# Patient Record
Sex: MALE | Race: WHITE | NOT HISPANIC OR LATINO | Employment: OTHER | URBAN - NONMETROPOLITAN AREA
[De-identification: names, ages, dates, MRNs, and addresses within clinical notes are randomized per-mention and may not be internally consistent; named-entity substitution may affect disease eponyms.]

---

## 2018-02-01 ENCOUNTER — OFFICE VISIT (OUTPATIENT)
Dept: GASTROENTEROLOGY | Facility: CLINIC | Age: 68
End: 2018-02-01

## 2018-02-01 VITALS
DIASTOLIC BLOOD PRESSURE: 80 MMHG | OXYGEN SATURATION: 98 % | WEIGHT: 220 LBS | BODY MASS INDEX: 29.8 KG/M2 | SYSTOLIC BLOOD PRESSURE: 122 MMHG | HEART RATE: 59 BPM | HEIGHT: 72 IN

## 2018-02-01 DIAGNOSIS — Z86.010 HX OF COLONIC POLYPS: Primary | ICD-10-CM

## 2018-02-01 PROBLEM — Z86.0100 HX OF COLONIC POLYPS: Status: ACTIVE | Noted: 2018-02-01

## 2018-02-01 PROCEDURE — S0260 H&P FOR SURGERY: HCPCS | Performed by: CLINICAL NURSE SPECIALIST

## 2018-02-01 RX ORDER — ATORVASTATIN CALCIUM 20 MG/1
20 TABLET, FILM COATED ORAL
COMMUNITY
Start: 2017-08-30 | End: 2022-06-30

## 2018-02-01 RX ORDER — SODIUM, POTASSIUM,MAG SULFATES 17.5-3.13G
SOLUTION, RECONSTITUTED, ORAL ORAL
Qty: 2 BOTTLE | Refills: 0 | Status: SHIPPED | OUTPATIENT
Start: 2018-02-01 | End: 2018-05-01 | Stop reason: HOSPADM

## 2018-02-01 NOTE — PROGRESS NOTES
Osbaldo Cat  1950 2/1/2018  Chief Complaint   Patient presents with   • Colonoscopy     Subjective   HPI  Osbaldo Cat is a 67 y.o. male who presents as a referral for preventative maintenance. He has no complaints of nausea or vomiting. No change in bowels. No wt loss. No BRBPR. No melena. There is no family hx for colon cancer. No abdominal pain.  Past Medical History:   Diagnosis Date   • Hx of colonic polyps    • Hypercholesteremia      Past Surgical History:   Procedure Laterality Date   • COLONOSCOPY  05/01/2013    snare ascending, 70, 50, ticks recall 1 year   • COLONOSCOPY W/ POLYPECTOMY  06/12/2014    Hyperplastic polyp at 70 cm repeat exam in 3 years   • HERNIA REPAIR       Outpatient Prescriptions Marked as Taking for the 2/1/18 encounter (Office Visit) with CIARRA Correia   Medication Sig Dispense Refill   • atorvastatin (LIPITOR) 20 MG tablet Take 20 mg by mouth.       No Known Allergies  Social History     Social History   • Marital status: Single     Spouse name: N/A   • Number of children: N/A   • Years of education: N/A     Occupational History   • Not on file.     Social History Main Topics   • Smoking status: Never Smoker   • Smokeless tobacco: Never Used   • Alcohol use Yes      Comment: Moderate   • Drug use: Not on file   • Sexual activity: Not on file     Other Topics Concern   • Not on file     Social History Narrative     Family History   Problem Relation Age of Onset   • Colon polyps Father    • Colon cancer Neg Hx      Health Maintenance   Topic Date Due   • TDAP/TD VACCINES (1 - Tdap) 02/11/1971   • PNEUMOCOCCAL VACCINES (65+ LOW/MEDIUM RISK) (1 of 2 - PCV13) 02/11/2017   • HEPATITIS C SCREENING  04/26/2017   • ZOSTER VACCINE  04/26/2017   • INFLUENZA VACCINE  08/01/2017   • LIPID PANEL  02/01/2018   • COLONOSCOPY  06/12/2024       REVIEW OF SYSTEMS  General: well appearing, no fever chills or sweats, no unexplained wt loss  HEENT: no acute visual or hearing  "disturbances  Cardiovascular: No chest pain or palpitations  Pulmonary: No shortness of breath, coughing, wheezing or hemoptysis  : No burning, urgency, hematuria, or dysuria  Musculoskeletal: No joint pain or stiffness  Peripheral: no edema  Skin: No lesions or rashes  Neuro: No dizziness, headaches, stroke, syncope  Endocrine: No hot or cold intolerances  Hematological: No blood dyscrasias    Objective   Vitals:    02/01/18 1404   BP: 122/80   Pulse: 59   SpO2: 98%   Weight: 99.8 kg (220 lb)   Height: 182.9 cm (72\")     Body mass index is 29.84 kg/(m^2).  Patient's BMI is within normal parameters. No follow-up required.      PHYSICAL EXAM  General: age appropriate well nourished well appearing, no acute distress  Head: normocephalic and atraumatic  Global assessment-supple  Neck-No JVD noted, no lymphadenopathy  Pulmonary-clear to auscultation bilaterally, normal respiratory effort  Cardiovascular-normal rate and rhythm, normal heart sounds, S1 and S2 noted  Abdomen-soft, non tender, non distended, normal bowel sounds all 4 quadrants, no hepatosplenomegaly noted  Extremities-No clubbing cyanosis or edema  Neuro-Non focal, converses appropriately, awake, alert, oriented    Assessment/Plan     Osbaldo was seen today for colonoscopy.    Diagnoses and all orders for this visit:    Hx of colonic polyps  -     SUPREP BOWEL PREP KIT 17.5-3.13-1.6 GM/180ML solution oral solution; Take as directed by office instructions provided  -     Case Request; Standing  -     Implement Anesthesia Orders Day of Procedure; Standing  -     Obtain Informed Consent; Standing  -     Verify bowel prep was successful; Standing  -     Case Request        COLONOSCOPY WITH ANESTHESIA (N/A)  Body mass index is 29.84 kg/(m^2).    Patient instructions on prep prior to procedure provided to the patient.    All risks, benefits, alternatives, and indications of colonoscopy procedure have been discussed with the patient. Risks to include perforation " of the colon requiring possible surgery or colostomy, risk of bleeding from biopsies or removal of colon tissue, possibility of missing a colon polyp or cancer, or adverse drug reaction.  Benefits to include the diagnosis and management of disease of the colon and rectum. Alternatives to include barium enema, radiographic evaluation, lab testing or no intervention. Pt verbalizes understanding and agrees.     Ashanti Dolan, APRN  2/1/2018  2:14 PM

## 2018-04-25 ENCOUNTER — TELEPHONE (OUTPATIENT)
Dept: GASTROENTEROLOGY | Facility: CLINIC | Age: 68
End: 2018-04-25

## 2018-04-25 NOTE — TELEPHONE ENCOUNTER
Patient called states he always does a 2 day prep prior to having his colon and no one said anything about that to him this time he is scheduled for May 1 told him to do a clear liquid supper May 29 and drink a bottle of mag citrate that night and then on the 30 clear liquids all day and start regular prep and follow instructions on his prep sheet.

## 2018-05-01 ENCOUNTER — ANESTHESIA EVENT (OUTPATIENT)
Dept: GASTROENTEROLOGY | Facility: HOSPITAL | Age: 68
End: 2018-05-01

## 2018-05-01 ENCOUNTER — ANESTHESIA (OUTPATIENT)
Dept: GASTROENTEROLOGY | Facility: HOSPITAL | Age: 68
End: 2018-05-01

## 2018-05-01 ENCOUNTER — HOSPITAL ENCOUNTER (OUTPATIENT)
Facility: HOSPITAL | Age: 68
Setting detail: HOSPITAL OUTPATIENT SURGERY
Discharge: HOME | End: 2018-05-01
Attending: INTERNAL MEDICINE | Admitting: INTERNAL MEDICINE

## 2018-05-01 VITALS
BODY MASS INDEX: 29.95 KG/M2 | DIASTOLIC BLOOD PRESSURE: 67 MMHG | SYSTOLIC BLOOD PRESSURE: 108 MMHG | HEIGHT: 73 IN | TEMPERATURE: 97.4 F | WEIGHT: 226 LBS | RESPIRATION RATE: 14 BRPM | HEART RATE: 53 BPM | OXYGEN SATURATION: 99 %

## 2018-05-01 DIAGNOSIS — Z86.010 HX OF COLONIC POLYPS: ICD-10-CM

## 2018-05-01 PROCEDURE — 45385 COLONOSCOPY W/LESION REMOVAL: CPT | Performed by: INTERNAL MEDICINE

## 2018-05-01 PROCEDURE — 88305 TISSUE EXAM BY PATHOLOGIST: CPT | Performed by: INTERNAL MEDICINE

## 2018-05-01 PROCEDURE — 25010000002 PROPOFOL 10 MG/ML EMULSION: Performed by: NURSE ANESTHETIST, CERTIFIED REGISTERED

## 2018-05-01 RX ORDER — PROPOFOL 10 MG/ML
VIAL (ML) INTRAVENOUS AS NEEDED
Status: DISCONTINUED | OUTPATIENT
Start: 2018-05-01 | End: 2018-05-01 | Stop reason: SURG

## 2018-05-01 RX ORDER — SODIUM CHLORIDE 9 MG/ML
100 INJECTION, SOLUTION INTRAVENOUS CONTINUOUS
Status: CANCELLED | OUTPATIENT
Start: 2018-05-01

## 2018-05-01 RX ORDER — SODIUM CHLORIDE 9 MG/ML
500 INJECTION, SOLUTION INTRAVENOUS CONTINUOUS PRN
Status: DISCONTINUED | OUTPATIENT
Start: 2018-05-01 | End: 2018-05-01 | Stop reason: HOSPADM

## 2018-05-01 RX ORDER — SODIUM CHLORIDE 0.9 % (FLUSH) 0.9 %
3 SYRINGE (ML) INJECTION AS NEEDED
Status: DISCONTINUED | OUTPATIENT
Start: 2018-05-01 | End: 2018-05-01 | Stop reason: HOSPADM

## 2018-05-01 RX ORDER — SODIUM CHLORIDE 0.9 % (FLUSH) 0.9 %
1-10 SYRINGE (ML) INJECTION AS NEEDED
Status: CANCELLED | OUTPATIENT
Start: 2018-05-01

## 2018-05-01 RX ADMIN — PROPOFOL 200 MG: 10 INJECTION, EMULSION INTRAVENOUS at 09:12

## 2018-05-01 RX ADMIN — SODIUM CHLORIDE 500 ML: 9 INJECTION, SOLUTION INTRAVENOUS at 08:18

## 2018-05-01 RX ADMIN — LIDOCAINE HYDROCHLORIDE 0.5 ML: 10 INJECTION, SOLUTION EPIDURAL; INFILTRATION; INTRACAUDAL; PERINEURAL at 08:18

## 2018-05-01 RX ADMIN — PROPOFOL 200 MG: 10 INJECTION, EMULSION INTRAVENOUS at 09:03

## 2018-05-01 NOTE — H&P
"[facility]astroenterology  Pre Procedure History & Physical    Chief Complaint:   History of polyps    Subjective     HPI:   Here for colonoscopy.  History of polyps.    Past Medical History:   Past Medical History:   Diagnosis Date   • History of stress test    • Hx of colonic polyps    • Hypercholesteremia        Past Surgical History:  Past Surgical History:   Procedure Laterality Date   • COLONOSCOPY  05/01/2013    snare ascending, 70, 50, ticks recall 1 year   • COLONOSCOPY W/ POLYPECTOMY  06/12/2014    Hyperplastic polyp at 70 cm repeat exam in 3 years   • HERNIA REPAIR         Family History:  Family History   Problem Relation Age of Onset   • Colon polyps Father    • Colon cancer Neg Hx        Social History:   reports that he has never smoked. He has never used smokeless tobacco. He reports that he drinks alcohol.    Medications:   Prior to Admission medications    Medication Sig Start Date End Date Taking? Authorizing Provider   atorvastatin (LIPITOR) 20 MG tablet Take 20 mg by mouth. 8/30/17  Yes Historical Provider, MD   SUPREP BOWEL PREP KIT 17.5-3.13-1.6 GM/180ML solution oral solution Take as directed by office instructions provided 2/1/18  Yes CIARRA Correia       Allergies:  Review of patient's allergies indicates no known allergies.    Objective     Blood pressure 135/91, pulse 56, temperature 97.4 °F (36.3 °C), temperature source Temporal Artery , resp. rate 18, height 185.4 cm (73\"), weight 103 kg (226 lb), SpO2 97 %.    Physical Exam   Constitutional: Pt is oriented to person, place, and in no distress.   HENT: Mouth/Throat: Oropharynx is clear.   Cardiovascular: Normal rate, regular rhythm.    Pulmonary/Chest: Effort normal. No respiratory distress. No  wheezes.   Abdominal: Soft. Non-distended.  Skin: Skin is warm and dry.   Psychiatric: Mood, memory, affect and judgment appear normal.     Assessment/Plan     Diagnosis:  History of polyps    Anticipated Surgical " Procedure:    Proceed with colonoscopy as scheduled    The following major R/B/A were discussed with the patient, however the list is not all inclusive . Risk:  Bleeding (immediate and delayed), perforation (rupture or tear), reaction to medication, missed lesion/cancer, pain during the procedure, infection, need for surgery, need for ostomy, need for mechanical ventilation (breathing machine), death.  Benefits: removal of polyp/tissue, burn/clip/or inject to stop bleeding, removal of foreign body, dilate any stricture.  Alternatives: Xray or CT, surgery, do nothing with associated risk   The patient was given time to ask question and received explanation, and agrees to proceed as per History and Physical.   No guarantee given or expressed.    EMR Dragon/transcription disclaimer: Much of this encounter note is an electronic transcription/translation of spoken language to printed text.  The electronic translation of spoken language may permit erroneous, or at times, nonsensical words or phrases to be inadvertently transcribed.  Although I have reviewed the note for such errors, some may still exist.    Vicente Richter MD  9:05 AM  5/1/2018

## 2018-05-01 NOTE — ANESTHESIA POSTPROCEDURE EVALUATION
"Patient: Osbaldo Cat    Procedure Summary     Date:  05/01/18 Room / Location:  [facility] ENDOSCOPY 4 / [facility] ENDOSCOPY    Anesthesia Start:  0903 Anesthesia Stop:  0925    Procedure:  COLONOSCOPY WITH ANESTHESIA (N/A ) Diagnosis:       Hx of colonic polyps      (Hx of colonic polyps [Z86.010])    Surgeon:  Vicente Richter MD Provider:  Marco Browning CRNA    Anesthesia Type:  general ASA Status:  2          Anesthesia Type: general  Last vitals  BP   135/91 (05/01/18 0755)   Temp   97.4 °F (36.3 °C) (05/01/18 0755)   Pulse   56 (05/01/18 0755)   Resp   18 (05/01/18 0755)     SpO2   97 % (05/01/18 0755)     Post Anesthesia Care and Evaluation    Patient location during evaluation: PACU  Patient participation: complete - patient participated  Level of consciousness: awake and alert  Pain management: adequate  Airway patency: patent  Anesthetic complications: No anesthetic complications    Cardiovascular status: acceptable  Respiratory status: acceptable  Hydration status: acceptable    Comments: Blood pressure 135/91, pulse 56, temperature 97.4 °F (36.3 °C), temperature source Temporal Artery , resp. rate 18, height 185.4 cm (73\"), weight 103 kg (226 lb), SpO2 97 %.    Pt discharged from PACU based on peggy score >8      "

## 2018-05-01 NOTE — ANESTHESIA PREPROCEDURE EVALUATION
Anesthesia Evaluation     Patient summary reviewed and Nursing notes reviewed   NPO Solid Status: > 8 hours  NPO Liquid Status: > 4 hours           Airway   Mallampati: I  TM distance: >3 FB  Neck ROM: full  No difficulty expected  Dental - normal exam     Pulmonary - negative pulmonary ROS and normal exam   Cardiovascular - normal exam    (+) hyperlipidemia,       Neuro/Psych- negative ROS  GI/Hepatic/Renal/Endo - negative ROS     Musculoskeletal (-) negative ROS    Abdominal  - normal exam   Substance History - negative use     OB/GYN negative ob/gyn ROS         Other - negative ROS                       Anesthesia Plan    ASA 2     general   total IV anesthesia  intravenous induction   Anesthetic plan and risks discussed with patient and spouse/significant other.

## 2018-05-02 LAB
CYTO UR: NORMAL
LAB AP CASE REPORT: NORMAL
LAB AP CLINICAL INFORMATION: NORMAL
Lab: NORMAL
PATH REPORT.FINAL DX SPEC: NORMAL
PATH REPORT.GROSS SPEC: NORMAL

## 2021-07-12 NOTE — PROGRESS NOTES
"Chief Complaint  Elevated PSA    Subjective          Osbaldo Cat presents to [facility] UROLOGY for   History of Present Illness  Elevated PSA  Location: Presumably prostate gland  Severity: 9.4 ng/mL  Duration: He was first made aware of an elevated PSA about  About six months ago.   Context: This was initially done as a prostate cancer screening tool.   Modifying factors: None  Associated signs or symptoms: mild LUTS (See symptom score below) . Patient denies any possible systemic symptoms of prostate cancer such as weight loss, lower extremity edema, or skeletal pain that could be worrisome for metastases.        Current Outpatient Medications:   •  atorvastatin (LIPITOR) 20 MG tablet, Take 20 mg by mouth., Disp: , Rfl:   •  coenzyme Q10 50 MG capsule capsule, Take 50 mg by mouth Daily., Disp: , Rfl:   •  glucosamine-chondroitin 500-400 MG capsule capsule, Take 1 capsule by mouth., Disp: , Rfl:   •  cephalexin (Keflex) 500 MG capsule, Take 1 capsule by mouth 2 (Two) Times a Day for 2 days. Start the day before the biopsy, Disp: 4 capsule, Rfl: 0  Past Medical History:   Diagnosis Date   • History of stress test    • Hx of colonic polyps    • Hypercholesteremia      Past Surgical History:   Procedure Laterality Date   • COLONOSCOPY  05/01/2013    snare ascending, 70, 50, ticks recall 1 year   • COLONOSCOPY N/A 5/1/2018    Procedure: COLONOSCOPY WITH ANESTHESIA;  Surgeon: Vicente Richter MD;  Location: [facility] ENDOSCOPY;  Service: Gastroenterology   • COLONOSCOPY W/ POLYPECTOMY  06/12/2014    Hyperplastic polyp at 70 cm repeat exam in 3 years   • HERNIA REPAIR           Review  of systems  Constitutional: Negative for chills and fever.   Gastrointestinal: Negative for abdominal pain, anal bleeding and blood in stool.   Genitourinary: Negative for flank pain and hematuria.     Objective   PHYSICAL EXAM  Vital Signs:   Temp 97.2 °F (36.2 °C) (Temporal)   Ht 186.7 cm (73.5\")   Wt 105 kg (230 lb " 12.8 oz)   BMI 30.04 kg/m²     Constitutional: Patient is without distress or deformity.  Vital signs are reviewed as above.    Neuro: No confusion; No disorientation; Alert and oriented  Pulmonary: No respiratory distress.   Skin: No pallor or diaphoresis  GI: Abdomen is soft and nontender.  No significant distention.  No hernias noted.  : Penis and testicles are normal. Benign feeling prostate without nodule approximately 35 mL in size.           DATA  Result Review :     PSA  DATE  9.4  12/24/2020  9.8  05/12/2021    International Prostate Symptom Score  The following is posted based on patient questionnaire answers:  0 - not at all    1-7 mild symptoms  1- Less than one time in five  8-19 moderate symptoms  2 -Less than half the time  20-35 severe symptoms  3 - About half the time  4 - More than half the time  5 - Almost always     For following sections:  Incomplete Emptying: - How often have you had the sensation  of not emptying your bladder completely after you finished urinating?  1  Frequency: -How often have you had to urinate again less than   two hours after you finished urinating?      1  Intermittency: -How often have you found you stopped and started again  Several times when you urinate?       0  Urgency: -How often do you find it difficult to postpone urination?             1  Weak stream: - How often have you had a weak urinary stream?             0  Straining: - How often have you had to push or strain to begin  Urination?          0  Sleeping: -How many times did you most typically get up to urinate   From the time you went to bed at night until the time you got up in the   0  Morning          Total `  3    Quality of Life  How would you feel if you had to live with your urinary condition the way   1  It is now, no better, no worse for the rest of your life?    Where: 0=delighted; 1= pleased, 2= mostly satisfied, 3= mixed, 4 = mostly  Dissatisfied, 5= Unhappy, 6 = terrible              ASSESSMENT AND PLAN      Problem List Items Addressed This Visit     None      Visit Diagnoses     Elevated prostate specific antigen (PSA)    -  Primary    Relevant Medications    cephalexin (Keflex) 500 MG capsule    Other Relevant Orders    POC Urinalysis Dipstick, Multipro (Completed)    Biopsy Prostate      I discussed elevated PSA with him today. We discussed that PSA is a protein measured in the bloodstream that comes exclusively from the prostate gland.  I mentioned to him that all men with a prostate gland will have a certain PSA level. We discussed that this number can be compared to all men and age-specific PSA as well as PSA velocity. We discussed that Prostate Cancer is a possible cause of PSA elevaton, but benign etiologies such as infection, enlargement, aging, and inflammation should also be considered. We discussed that some patients with a normal PSA may also have prostate cancer. The necessity of digital rectal examination is also discussed. The role of free to total PSA Ratio is explained.  We also discussed the relatively recent recommendations of the national prevented task force.  It is explained that the PSA has been downgraded as a standard screening tool.  Essentially this downgrading recommends the study not be used or prostate cancer screening done due to the high risk of a diagnosis of prostate cancer that is life-threatening which, if treated, can result in sexual or severe urinary side effects.  This is compared with comments by both the American Urologic Association and the American Association Clinical Urologists. Discussions among the urology community has led most of us to feel strongly that a patient has a right to know if he has prostate cancer and that all options including a conservative approach to the management of prostate cancer should be discussed between A patient and a physician familiar with the treatment options for prostate cancer.  Additional emphasis is made  regarding the possibility of the diagnosis of the nonlife threatening prostate cancer which could affect the patient psychologically or even result for treatment of disease were the risks of that treatment exceeds the risk of death.  The risks (infection, bleeding, pain, retention, sepsis) and possible benefits of transrectal ultrasound with biopsy of the prostate gland is also discussed. It is explained that some patients require a repeat biopsy based on diagnosis of prostate intraepithelial neoplasia or even a continued rising PSA after an initial biopsy. He voiced no additional questions. He has elected to proceed with TRUS and biopsy of the prostate         FOLLOW UP     No follow-ups on file.        (Please note that portions of this note were completed with a voice recognition program.)  Eliot Kay MD  07/15/21  09:14 CDT

## 2021-07-15 ENCOUNTER — OFFICE VISIT (OUTPATIENT)
Dept: UROLOGY | Facility: CLINIC | Age: 71
End: 2021-07-15

## 2021-07-15 VITALS — WEIGHT: 230.8 LBS | BODY MASS INDEX: 29.62 KG/M2 | HEIGHT: 74 IN | TEMPERATURE: 97.2 F

## 2021-07-15 DIAGNOSIS — R97.20 ELEVATED PROSTATE SPECIFIC ANTIGEN (PSA): Primary | ICD-10-CM

## 2021-07-15 LAB
BILIRUB BLD-MCNC: NEGATIVE MG/DL
CLARITY, POC: CLEAR
COLOR UR: YELLOW
GLUCOSE UR STRIP-MCNC: NEGATIVE MG/DL
KETONES UR QL: NEGATIVE
LEUKOCYTE EST, POC: NEGATIVE
NITRITE UR-MCNC: NEGATIVE MG/ML
PH UR: 6.5 [PH] (ref 5–8)
PROT UR STRIP-MCNC: NEGATIVE MG/DL
RBC # UR STRIP: ABNORMAL /UL
SP GR UR: 1 (ref 1–1.03)
UROBILINOGEN UR QL: NORMAL

## 2021-07-15 PROCEDURE — 99204 OFFICE O/P NEW MOD 45 MIN: CPT | Performed by: UROLOGY

## 2021-07-15 PROCEDURE — 81001 URINALYSIS AUTO W/SCOPE: CPT | Performed by: UROLOGY

## 2021-07-15 RX ORDER — CEPHALEXIN 500 MG/1
500 CAPSULE ORAL 2 TIMES DAILY
Qty: 4 CAPSULE | Refills: 0 | Status: SHIPPED | OUTPATIENT
Start: 2021-07-15 | End: 2021-07-17

## 2021-07-15 RX ORDER — SODIUM PHOSPHATE,MONO-DIBASIC 19G-7G/118
1 ENEMA (ML) RECTAL
COMMUNITY

## 2021-07-15 RX ORDER — UBIDECARENONE 50 MG
50 CAPSULE ORAL DAILY
COMMUNITY

## 2021-08-06 ENCOUNTER — PROCEDURE VISIT (OUTPATIENT)
Dept: UROLOGY | Facility: CLINIC | Age: 71
End: 2021-08-06

## 2021-08-06 DIAGNOSIS — R97.20 ELEVATED PROSTATE SPECIFIC ANTIGEN (PSA): Primary | ICD-10-CM

## 2021-08-06 PROCEDURE — 76942 ECHO GUIDE FOR BIOPSY: CPT | Performed by: UROLOGY

## 2021-08-06 PROCEDURE — G0416 PROSTATE BIOPSY, ANY MTHD: HCPCS | Performed by: UROLOGY

## 2021-08-06 PROCEDURE — 88341 IMHCHEM/IMCYTCHM EA ADD ANTB: CPT | Performed by: UROLOGY

## 2021-08-06 PROCEDURE — 55700 PR PROSTATE NEEDLE BIOPSY ANY APPROACH: CPT | Performed by: UROLOGY

## 2021-08-06 PROCEDURE — 88342 IMHCHEM/IMCYTCHM 1ST ANTB: CPT | Performed by: UROLOGY

## 2021-08-10 LAB
CYTO UR: NORMAL
LAB AP CASE REPORT: NORMAL
PATH REPORT.FINAL DX SPEC: NORMAL
PATH REPORT.GROSS SPEC: NORMAL

## 2021-08-12 ENCOUNTER — TELEPHONE (OUTPATIENT)
Dept: UROLOGY | Facility: CLINIC | Age: 71
End: 2021-08-12

## 2021-08-12 DIAGNOSIS — R97.20 ELEVATED PROSTATE SPECIFIC ANTIGEN (PSA): Primary | ICD-10-CM

## 2021-08-12 NOTE — TELEPHONE ENCOUNTER
The patient was contacted by phone of the negative biopsy. It is explained that patient should continue close follow up since approximately 5-10% of patients with a diagnosis of prostate cancer have a history of a negative biopsy. I recommended that he undergo PSA and rectal exam in six months.   Tissue Pathology Exam (08/06/2021 07:53)   Electronically signed by Eliot Kay MD, 08/12/21, 4:53 PM CDT.

## 2022-06-30 ENCOUNTER — OFFICE VISIT (OUTPATIENT)
Dept: UROLOGY | Facility: CLINIC | Age: 72
End: 2022-06-30

## 2022-06-30 VITALS — HEIGHT: 74 IN | TEMPERATURE: 97.7 F | BODY MASS INDEX: 27.23 KG/M2 | WEIGHT: 212.2 LBS

## 2022-06-30 DIAGNOSIS — R97.20 ELEVATED PROSTATE SPECIFIC ANTIGEN (PSA): Primary | ICD-10-CM

## 2022-06-30 LAB
BILIRUB BLD-MCNC: NEGATIVE MG/DL
CLARITY, POC: CLEAR
COLOR UR: YELLOW
GLUCOSE UR STRIP-MCNC: NEGATIVE MG/DL
KETONES UR QL: NEGATIVE
LEUKOCYTE EST, POC: NEGATIVE
NITRITE UR-MCNC: NEGATIVE MG/ML
PH UR: 6.5 [PH] (ref 5–8)
PROT UR STRIP-MCNC: NEGATIVE MG/DL
RBC # UR STRIP: ABNORMAL /UL
SP GR UR: 1.01 (ref 1–1.03)
UROBILINOGEN UR QL: NORMAL

## 2022-06-30 PROCEDURE — 81003 URINALYSIS AUTO W/O SCOPE: CPT | Performed by: UROLOGY

## 2022-06-30 PROCEDURE — 99213 OFFICE O/P EST LOW 20 MIN: CPT | Performed by: UROLOGY

## 2022-06-30 RX ORDER — ROSUVASTATIN CALCIUM 20 MG/1
10 TABLET, COATED ORAL DAILY
COMMUNITY

## 2022-06-30 RX ORDER — MULTIPLE VITAMINS W/ MINERALS TAB 9MG-400MCG
1 TAB ORAL DAILY
COMMUNITY

## 2022-06-30 NOTE — PROGRESS NOTES
"Chief Complaint  Follow-up elevated PSA    Subjective          Osbaldo Cat presents to [facility] UR[facility]   Returns for follow-up of elevated PSA.  Patient has prior negative biopsy that was done August 6, 2021 for an elevated PSA of 9.8.  PSA at end of December 2020 was 9.4.  Progress Notes by Eliot Kay MD (08/06/2021 08:00)   Tissue Pathology Exam (08/06/2021 07:53)         Current Outpatient Medications:   •  coenzyme Q10 50 MG capsule capsule, Take 50 mg by mouth Daily., Disp: , Rfl:   •  glucosamine-chondroitin 500-400 MG capsule capsule, Take 1 capsule by mouth., Disp: , Rfl:   •  multivitamin with minerals tablet tablet, Take 1 tablet by mouth Daily. [facility], Disp: , Rfl:   •  rosuvastatin (CRESTOR) 20 MG tablet, Take 20 mg by mouth Daily., Disp: , Rfl:   Past Medical History:   Diagnosis Date   • History of stress test    • Hx of colonic polyps    • Hypercholesteremia      Past Surgical History:   Procedure Laterality Date   • COLONOSCOPY  05/01/2013    snare ascending, 70, 50, ticks recall 1 year   • COLONOSCOPY N/A 5/1/2018    Procedure: COLONOSCOPY WITH ANESTHESIA;  Surgeon: Vicente Richter MD;  Location: [facility] ENDOSCOPY;  Service: Gastroenterology   • COLONOSCOPY W/ POLYPECTOMY  06/12/2014    Hyperplastic polyp at 70 cm repeat exam in 3 years   • HERNIA REPAIR             Review  of systems  Constitutional: Negative for chills or fever.   Gastrointestinal: Negative for abdominal pain, anal bleeding or blood in stool.           Objective   PHYSICAL EXAM  Vital Signs:   Temp 97.7 °F (36.5 °C)   Ht 186.7 cm (73.5\")   Wt 96.3 kg (212 lb 3.2 oz)   BMI 27.62 kg/m²     Constitutional: Patient is without distress or deformity.  Vital signs are reviewed as above.    Neuro: No confusion; No disorientation; Alert and oriented  Pulmonary: No respiratory distress.   Skin: No pallor or diaphoresis      DATA  Result Review :              Results for orders placed or " performed in visit on 06/30/22   POC Urinalysis Dipstick, Multipro    Specimen: Urine   Result Value Ref Range    Color Yellow Yellow, Straw, Dark Yellow, Leana    Clarity, UA Clear Clear    Glucose, UA Negative Negative mg/dL    Bilirubin Negative Negative    Ketones, UA Negative Negative    Specific Gravity  1.015 1.005 - 1.030    Blood, UA Trace (A) Negative    pH, Urine 6.5 5.0 - 8.0    Protein, POC Negative Negative mg/dL    Urobilinogen, UA Normal Normal    Nitrite, UA Negative Negative    Leukocytes Negative Negative                      ASSESSMENT AND PLAN          Problem List Items Addressed This Visit    None     Visit Diagnoses     Elevated prostate specific antigen (PSA)    -  Primary    Relevant Orders    POC Urinalysis Dipstick, Multipro (Completed)    PSA DIAGNOSTIC      This represents an undiagnosed  problem with uncertain prognosis.  I discussed elevated PSA with the patient today.  We discussed that PSA is a protein measured in the bloodstream that comes exclusively from the prostate gland.  I mentioned to him that all men with a prostate gland will have a certain PSA level.  We discussed this number can be compared to all men, or men of a certain age.  We can also follow trend of PSA which is called the PSA velocity.  We discussed the prostate cancer is a possible cause of PSA elevation, but benign etiologies such as infection, enlargement, aging, and inflammation should also be considered.  There is also convincing evidence that some patients will have a PSA that waxes and wanes completely unrelated to symptomatic disease of the prostate for cancer.  We discussed that some patients with a normal PSA may also have prostate cancer.  The necessity of digital rectal exam is also discussed.  We discussed the role of free to total PSA ratio.  It is explained that this test is done in hopes of avoiding unnecessary biopsies, but that a few patients with prostate cancer will have false-negative results  "when measuring there free PSA.  We also discussed that PSA, in many patients, varies considerably for unexplained reasons.  Sometimes repeating the PSA in a few months gives time for a \"correction \" to baseline.  We have elected to repeat the total PSA.  We did discuss the natural course of prostate cancer in most patients.  It is explained that waiting to see what the results of this test*are very unlikely to affect the clinical course of the disease.  However, there are exceptions in the biology of each cancer.  The risks and possible benefits of transrectal ultrasound with biopsy of the prostate gland is also discussed.  I did explain that biopsy is the standard of care for diagnosing prostate cancer. The risks, alternatives, and benefits of this treatment recommendation are discussed.  I did answer all questions of the patient.            FOLLOW UP     Return in about 6 months (around 12/30/2022).        (Please note that portions of this note were completed with a voice recognition program.)  Eliot Kay MD  06/30/22  14:31 CDT    "

## 2023-01-04 ENCOUNTER — TELEPHONE (OUTPATIENT)
Dept: UROLOGY | Facility: CLINIC | Age: 73
End: 2023-01-04
Payer: MEDICARE

## 2023-01-04 NOTE — TELEPHONE ENCOUNTER
Caller: Osbaldo Cat    Relationship to patient: SELF    Best call back number:[phone]    Patient is needing: PT CALLED AND IS WANTING TO HAVE ORDER FAXED TO [facility] IN [facility]. FOR PSA LABS TO BE DRAWN. [facility] FAX# [phone]. PLEASE FAX THAT OVER TO THE OFFICE SO THAT THE PT CAN HAVE HIS LABS DONE BEFORE APPT IS SCHEDULED WITH DR CLARK

## 2023-01-12 ENCOUNTER — TELEPHONE (OUTPATIENT)
Dept: UROLOGY | Facility: CLINIC | Age: 73
End: 2023-01-12
Payer: MEDICARE

## 2023-01-12 NOTE — TELEPHONE ENCOUNTER
Called Patient to remind them to get PSA prior to appointment.  Spoke with Patient Daughter.  She is going to have the pt call us.  If patient calls back it is ok for the HUB to tell the pt the message.

## 2023-01-12 NOTE — TELEPHONE ENCOUNTER
Provider: DR CLARK  Caller: Osbaldo Cat  Relationship to Patient: Self     Phone Number: [phone]  Reason for Call: PT RETURNED TABBY'S CALL. PT DID HAVE LABS DONE Tuesday AT [facility]. HE DOES NOT HAVE RESULTS YET, HE WILL CALL [facility] TO SEE IF HE CAN GET THOSE EMAILED/FAXED/PRINTED FOR HIS APPT ON Monday.

## 2023-01-12 NOTE — PROGRESS NOTES
"Chief Complaint  Elevated PSA    Subjective          Osbaldo Cat presents to [facility] UROLOGY [place]   This patient that I follow for elevated PSA.  In August 2021 noted a transrectal sound prostate with biopsy.   Given a PSA of 9.80 had a PSA density 0.27.  He returns today for follow-up.  Patient denieProstate volume was 36.8 mL. Denies any possible systemic symptoms of prostate cancer such as weight loss, lower extremity edema, or skeletal pain that could be worrisome for systemic disease.  His lower urinary tract symptoms are not bothersome. He is very healthy.           Current Outpatient Medications:   •  coenzyme Q10 50 MG capsule capsule, Take 50 mg by mouth Daily., Disp: , Rfl:   •  glucosamine-chondroitin 500-400 MG capsule capsule, Take 1 capsule by mouth., Disp: , Rfl:   •  multivitamin with minerals tablet tablet, Take 1 tablet by mouth Daily. Health and vitality [facility], Disp: , Rfl:   •  rosuvastatin (CRESTOR) 20 MG tablet, Take 10 mg by mouth Daily., Disp: , Rfl:   Past Medical History:   Diagnosis Date   • History of stress test    • Hx of colonic polyps    • Hypercholesteremia      Past Surgical History:   Procedure Laterality Date   • COLONOSCOPY  05/01/2013    snare ascending, 70, 50, ticks recall 1 year   • COLONOSCOPY N/A 5/1/2018    Procedure: COLONOSCOPY WITH ANESTHESIA;  Surgeon: Vicente Richter MD;  Location: [facility] ENDOSCOPY;  Service: Gastroenterology   • COLONOSCOPY W/ POLYPECTOMY  06/12/2014    Hyperplastic polyp at 70 cm repeat exam in 3 years   • HERNIA REPAIR             Review of Systems      Objective   PHYSICAL EXAM  Vital Signs:   Temp 96.3 °F (35.7 °C)   Ht 185.4 cm (73\")   Wt 101 kg (222 lb 3.2 oz)   BMI 29.32 kg/m²     Physical Exam  The prostate is approximately 40 ml. It is Symmetric, with a Soft consistency. There are no nodules present. . The seminal vesicles are Palpably normal in size and without mass.      DATA  Result Review :        "       Results for orders placed or performed in visit on 01/16/23   POC Urinalysis Dipstick, Multipro    Specimen: Urine   Result Value Ref Range    Color Yellow Yellow, Straw, Dark Yellow, Leana    Clarity, UA Clear Clear    Glucose, UA Negative Negative mg/dL    Bilirubin Negative Negative    Ketones, UA Negative Negative    Specific Gravity  1.020 1.005 - 1.030    Blood, UA Negative Negative    pH, Urine 6.5 5.0 - 8.0    Protein, POC Negative Negative mg/dL    Urobilinogen, UA Normal Normal, 0.2 E.U./dL    Nitrite, UA Negative Negative    Leukocytes Negative Negative       No results found for: PSA          12/2022: PSA 14.55         ASSESSMENT AND PLAN          Problem List Items Addressed This Visit    None  Visit Diagnoses     Elevated prostate specific antigen (PSA)    -  Primary    Relevant Orders    POC Urinalysis Dipstick, Multipro (Completed)      This gentleman is very healthy and physically active for his age.  I do have concerns that his PSA has now increased up to 14.55.  He is very interested in supplements and nutrition for management and risk reduction for prostate cancer.  He has a prior biopsy that showed no evidence of prostate cancer.  We discussed the following options  -Multi parametric MRI using prostate protocol  -Biomarker tests such as Select MDX, 4K score, new  -Continued observation with repeat PSA at 6 months.  -Repeat biopsy    Ultimately he agreed to have his PSA repeated in 6 months and undergo select MDX unless the PSA improves considerably.  He is aware of the risk that he very well could have prostate cancer considering that about 10% of patients diagnosed with prostate cancer have a history of a prior negative biopsy.    FOLLOW UP     No follow-ups on file.        (Please note that portions of this note were completed with a voice recognition program.)  Eliot Kay MD  01/16/23  14:49 CST

## 2023-01-16 ENCOUNTER — OFFICE VISIT (OUTPATIENT)
Dept: UROLOGY | Facility: CLINIC | Age: 73
End: 2023-01-16
Payer: MEDICARE

## 2023-01-16 VITALS — BODY MASS INDEX: 29.45 KG/M2 | WEIGHT: 222.2 LBS | HEIGHT: 73 IN | TEMPERATURE: 96.3 F

## 2023-01-16 DIAGNOSIS — R97.20 ELEVATED PROSTATE SPECIFIC ANTIGEN (PSA): Primary | ICD-10-CM

## 2023-01-16 LAB
BILIRUB BLD-MCNC: NEGATIVE MG/DL
CLARITY, POC: CLEAR
COLOR UR: YELLOW
GLUCOSE UR STRIP-MCNC: NEGATIVE MG/DL
KETONES UR QL: NEGATIVE
LEUKOCYTE EST, POC: NEGATIVE
NITRITE UR-MCNC: NEGATIVE MG/ML
PH UR: 6.5 [PH] (ref 5–8)
PROT UR STRIP-MCNC: NEGATIVE MG/DL
RBC # UR STRIP: NEGATIVE /UL
SP GR UR: 1.02 (ref 1–1.03)
UROBILINOGEN UR QL: NORMAL

## 2023-01-16 PROCEDURE — 99213 OFFICE O/P EST LOW 20 MIN: CPT | Performed by: UROLOGY

## 2023-01-16 PROCEDURE — 81003 URINALYSIS AUTO W/O SCOPE: CPT | Performed by: UROLOGY

## 2023-05-23 ENCOUNTER — OFFICE VISIT (OUTPATIENT)
Dept: GASTROENTEROLOGY | Facility: CLINIC | Age: 73
End: 2023-05-23
Payer: MEDICARE

## 2023-05-23 VITALS
BODY MASS INDEX: 28.89 KG/M2 | SYSTOLIC BLOOD PRESSURE: 116 MMHG | HEART RATE: 64 BPM | TEMPERATURE: 96.4 F | WEIGHT: 218 LBS | DIASTOLIC BLOOD PRESSURE: 60 MMHG | OXYGEN SATURATION: 96 % | HEIGHT: 73 IN

## 2023-05-23 DIAGNOSIS — Z86.010 HX OF COLONIC POLYPS: Primary | ICD-10-CM

## 2023-05-23 DIAGNOSIS — Z78.9 NONSMOKER: ICD-10-CM

## 2023-05-23 RX ORDER — SODIUM PICOSULFATE, MAGNESIUM OXIDE, AND ANHYDROUS CITRIC ACID 10; 3.5; 12 MG/160ML; G/160ML; G/160ML
175 LIQUID ORAL TAKE AS DIRECTED
Qty: 350 ML | Refills: 0 | Status: SHIPPED | OUTPATIENT
Start: 2023-05-23

## 2023-05-23 NOTE — PROGRESS NOTES
Osbaldo Cat  1950 5/23/2023  Chief Complaint   Patient presents with    Colonoscopy     Subjective   HPI  Osbaldo Cat is a 73 y.o. male who presents as a referral for preventative maintenance. He has no complaints of nausea or vomiting. No change in bowels. No wt loss. No BRBPR. No melena. There is no family hx for colon cancer. No abdominal pain.  Past Medical History:   Diagnosis Date    History of stress test     Hx of colonic polyps     Hypercholesteremia      Past Surgical History:   Procedure Laterality Date    COLONOSCOPY  05/01/2013    snare ascending, 70, 50, ticks recall 1 year    COLONOSCOPY N/A 05/01/2018    Hyperplastic polyp Hepatic Flexure, tics repeat exam in 5 years    COLONOSCOPY W/ POLYPECTOMY  06/12/2014    Hyperplastic polyp at 70 cm repeat exam in 3 years    HERNIA REPAIR       Outpatient Medications Marked as Taking for the 5/23/23 encounter (Office Visit) with Ashanti Dolan APRN   Medication Sig Dispense Refill    coenzyme Q10 50 MG capsule capsule Take 1 capsule by mouth Daily.      glucosamine-chondroitin 500-400 MG capsule capsule Take 1 capsule by mouth.      multivitamin with minerals tablet tablet Take 1 tablet by mouth Daily. [facility] and [facility]      rosuvastatin (CRESTOR) 20 MG tablet Take 10 mg by mouth Daily.       No Known Allergies  Social History     Socioeconomic History    Marital status: Single   Tobacco Use    Smoking status: Never    Smokeless tobacco: Never   Vaping Use    Vaping Use: Never used   Substance and Sexual Activity    Alcohol use: Yes     Comment: Moderate    Drug use: Never    Sexual activity: Defer     Family History   Problem Relation Age of Onset    Colon polyps Father     Colon cancer Neg Hx      Health Maintenance   Topic Date Due    COVID-19 Vaccine (1) Never done    TDAP/TD VACCINES (1 - Tdap) Never done    ZOSTER VACCINE (1 of 2) Never done    Pneumococcal Vaccine 65+ (1 - PCV) Never done    HEPATITIS C SCREENING  Never done  "   ANNUAL WELLNESS VISIT  Never done    LIPID PANEL  Never done    COLORECTAL CANCER SCREENING  05/01/2023    INFLUENZA VACCINE  08/01/2023       REVIEW OF SYSTEMS  General: well appearing, no fever chills or sweats, no unexplained wt loss  HEENT: no acute visual or hearing disturbances  Cardiovascular: No chest pain or palpitations  Pulmonary: No shortness of breath, coughing, wheezing or hemoptysis  : No burning, urgency, hematuria, or dysuria  Musculoskeletal: No joint pain or stiffness  Peripheral: no edema  Skin: No lesions or rashes  Neuro: No dizziness, headaches, stroke, syncope  Endocrine: No hot or cold intolerances  Hematological: No blood dyscrasias    Objective   Vitals:    05/23/23 1330   BP: 116/60   Pulse: 64   Temp: 96.4 °F (35.8 °C)   TempSrc: Temporal   SpO2: 96%   Weight: 98.9 kg (218 lb)   Height: 185.4 cm (73\")     Body mass index is 28.76 kg/m².  BMI is >= 25 and <30. (Overweight) The following options were offered after discussion;: weight loss educational material (shared in after visit summary)      PHYSICAL EXAM  General: age appropriate well nourished well appearing, no acute distress  Head: normocephalic and atraumatic  Global assessment-supple  Neck-No JVD noted, no lymphadenopathy  Pulmonary-clear to auscultation bilaterally, normal respiratory effort  Cardiovascular-normal rate and rhythm, normal heart sounds, S1 and S2 noted  Abdomen-soft, non tender, non distended, normal bowel sounds all 4 quadrants, no hepatosplenomegaly noted  Extremities-No clubbing cyanosis or edema  Neuro-Non focal, converses appropriately, awake, alert, oriented    Assessment & Plan     Diagnoses and all orders for this visit:    1. Hx of colonic polyps (Primary)  -     Case Request; Standing  -     Implement Anesthesia Orders Day of Procedure; Standing  -     Obtain Informed Consent; Standing  -     Follow Anesthesia Guidelines / Protocol; Future  -     Obtain Informed Consent; Future  -     Verify bowel " prep was successful; Standing  -     Case Request  -     Sod Picosulfate-Mag Ox-Cit Acd (Clenpiq) 10-3.5-12 MG-GM -GM/160ML solution; Take 175 mL by mouth Take As Directed.  Dispense: 350 mL; Refill: 0    2. Nonsmoker        COLONOSCOPY WITH ANESTHESIA (N/A)  Body mass index is 28.76 kg/m².    Patient instructions on prep prior to procedure provided to the patient.    All risks, benefits, alternatives, and indications of colonoscopy procedure have been discussed with the patient. Risks to include perforation of the colon requiring possible surgery or colostomy, risk of bleeding from biopsies or removal of colon tissue, possibility of missing a colon polyp or cancer, or adverse drug reaction.  Benefits to include the diagnosis and management of disease of the colon and rectum. Alternatives to include barium enema, radiographic evaluation, lab testing or no intervention. Pt verbalizes understanding and agrees.     Ashanti Dolan, APRN  2023  14:06 CDT      IF YOU SMOKE OR USE TOBACCO PLEASE READ THE FOLLOWIN minutes reading provided    Why is smoking bad for me?  Smoking increases the risk of heart disease, lung disease, vascular disease, stroke, and cancer.     If you smoke, STOP!    If you would like more information on quitting smoking, please visit the [employer] website: [email or web address]   This link will provide additional resources including the QUIT line and the Beat the Pack support groups.     For more information:    Quit Now [place]  -QUIT-NOW  [email or web address]

## 2023-07-26 DIAGNOSIS — R97.20 ELEVATED PROSTATE SPECIFIC ANTIGEN (PSA): Primary | ICD-10-CM

## 2023-08-07 DIAGNOSIS — Z86.010 HX OF COLONIC POLYPS: ICD-10-CM

## 2023-08-07 RX ORDER — SODIUM PICOSULFATE, MAGNESIUM OXIDE, AND ANHYDROUS CITRIC ACID 10; 3.5; 12 MG/160ML; G/160ML; G/160ML
175 LIQUID ORAL TAKE AS DIRECTED
Qty: 350 ML | Refills: 0 | Status: SHIPPED | OUTPATIENT
Start: 2023-08-07

## 2023-08-14 NOTE — PROGRESS NOTES
Chief Complaint  Elevated PSA    Subjective          Osbaldo Cat presents to [facility] UROLOGY   Patient with prior negative biopsy  Tissue Pathology Exam (08/06/2021 07:53) has a PSA that increased 11.82.  Patient denies any possible systemic symptoms of prostate cancer such as weight loss, lower extremity edema, or skeletal pain that could be worrisome for systemic disease. His Prostate volume is 36.8 mL    Current Outpatient Medications:     coenzyme Q10 50 MG capsule capsule, Take 1 capsule by mouth Daily., Disp: , Rfl:     glucosamine-chondroitin 500-400 MG capsule capsule, Take 1 capsule by mouth., Disp: , Rfl:     multivitamin with minerals tablet tablet, Take 1 tablet by mouth Daily. [facility] and vitality [facility], Disp: , Rfl:     rosuvastatin (CRESTOR) 20 MG tablet, Take 0.5 tablets by mouth Daily., Disp: , Rfl:     sildenafil (VIAGRA) 50 MG tablet, Take 1 tablet by mouth Daily As Needed for Erectile Dysfunction., Disp: , Rfl:   Past Medical History:   Diagnosis Date    History of stress test     Hx of colonic polyps     Hypercholesteremia      Past Surgical History:   Procedure Laterality Date    COLONOSCOPY  05/01/2013    snare ascending, 70, 50, ticks recall 1 year    COLONOSCOPY N/A 05/01/2018    Hyperplastic polyp Hepatic Flexure, tics repeat exam in 5 years    COLONOSCOPY N/A 8/15/2023    Procedure: COLONOSCOPY WITH ANESTHESIA;  Surgeon: Vicente Richter MD;  Location: [facility] ENDOSCOPY;  Service: Gastroenterology;  Laterality: N/A;  pre hx colon polyp  post diverticulosis, polyp  dr letha grimaldo    COLONOSCOPY W/ POLYPECTOMY  06/12/2014    Hyperplastic polyp at 70 cm repeat exam in 3 years    HERNIA REPAIR             Review of Systems  Review  of systems  Constitutional: Negative for chills and fever.   Gastrointestinal: Negative for abdominal pain, anal bleeding and blood in stool.   Genitourinary: Negative for flank pain and hematuria.      Objective   PHYSICAL EXAM  Vital  "Signs:   Temp 97 øF (36.1 øC)   Ht 185.4 cm (73\")   Wt 102 kg (225 lb)   BMI 29.69 kg/mý     Physical Exam  Constitutional: Patient is without distress or deformity.  Vital signs are reviewed as above.    Neuro: No confusion; No disorientation; Alert and oriented  Pulmonary: No respiratory distress.   Skin: No pallor or diaphoresis        DATA  Result Review :              Results for orders placed or performed during the hospital encounter of 08/21/23   POC Creatinine    Specimen: Blood   Result Value Ref Range    Creatinine 0.70 0.60 - 1.30 mg/dL       MRI Pelvis With & Without Contrast (08/21/2023 13:51)    Negative for suspicious lesion      No results found for: PSA    Date   PSA  06/2023  11.82  06/06/2022  12.4  05/12/2021  9.8     ASSESSMENT AND PLAN          Problem List Items Addressed This Visit    None  Visit Diagnoses       Elevated prostate specific antigen (PSA)    -  Primary    Relevant Orders    PSA DIAGNOSTIC        - His MRI is without evidence of prostate lesion.  I did explain how this would eliminate the option of a \"targeted \"biopsy based on MRI.  While this is a very good sign I did explain that approximately 15 to 20% of \"clinically significant \"prostate cancers are missed using MRI alone.  -We talked about the role of a repeat biopsy done using a transperineal technique.  -We talked about epigenetic studies, in particular confirm MDX which would predict the likelihood of detecting prostate cancer as well as breaking it down into low-grade disease versus [place] grade 7 or greater disease.        I spent 32 minutes caring for Osbaldo on this date of service. This time includes time spent by me in the following activities:preparing for the visit, reviewing tests, obtaining and/or reviewing a separately obtained history, performing a medically appropriate examination and/or evaluation , counseling and educating the patient/family/caregiver, ordering medications, tests, or procedures, " referring and communicating with other health care professionals , and documenting information in the medical record  FOLLOW UP     Return in about 6 months (around 2/24/2024).        (Please note that portions of this note were completed with a voice recognition program.)  Eliot Kay MD  08/24/23  11:10 CDT

## 2023-08-15 ENCOUNTER — ANESTHESIA (OUTPATIENT)
Dept: GASTROENTEROLOGY | Facility: HOSPITAL | Age: 73
End: 2023-08-15
Payer: MEDICARE

## 2023-08-15 ENCOUNTER — ANESTHESIA EVENT (OUTPATIENT)
Dept: GASTROENTEROLOGY | Facility: HOSPITAL | Age: 73
End: 2023-08-15
Payer: MEDICARE

## 2023-08-15 ENCOUNTER — HOSPITAL ENCOUNTER (OUTPATIENT)
Facility: HOSPITAL | Age: 73
Setting detail: HOSPITAL OUTPATIENT SURGERY
Discharge: HOME | End: 2023-08-15
Attending: INTERNAL MEDICINE | Admitting: INTERNAL MEDICINE
Payer: MEDICARE

## 2023-08-15 VITALS
HEART RATE: 58 BPM | WEIGHT: 219 LBS | RESPIRATION RATE: 15 BRPM | DIASTOLIC BLOOD PRESSURE: 90 MMHG | HEIGHT: 73 IN | SYSTOLIC BLOOD PRESSURE: 127 MMHG | TEMPERATURE: 96.3 F | OXYGEN SATURATION: 98 % | BODY MASS INDEX: 29.03 KG/M2

## 2023-08-15 DIAGNOSIS — Z86.010 HX OF COLONIC POLYPS: ICD-10-CM

## 2023-08-15 PROCEDURE — 88305 TISSUE EXAM BY PATHOLOGIST: CPT | Performed by: INTERNAL MEDICINE

## 2023-08-15 PROCEDURE — 25010000002 PROPOFOL 10 MG/ML EMULSION: Performed by: NURSE ANESTHETIST, CERTIFIED REGISTERED

## 2023-08-15 PROCEDURE — 45385 COLONOSCOPY W/LESION REMOVAL: CPT | Performed by: INTERNAL MEDICINE

## 2023-08-15 RX ORDER — SODIUM CHLORIDE 0.9 % (FLUSH) 0.9 %
10 SYRINGE (ML) INJECTION AS NEEDED
Status: DISCONTINUED | OUTPATIENT
Start: 2023-08-15 | End: 2023-08-15 | Stop reason: HOSPADM

## 2023-08-15 RX ORDER — PROPOFOL 10 MG/ML
VIAL (ML) INTRAVENOUS AS NEEDED
Status: DISCONTINUED | OUTPATIENT
Start: 2023-08-15 | End: 2023-08-15 | Stop reason: SURG

## 2023-08-15 RX ORDER — LIDOCAINE HYDROCHLORIDE 10 MG/ML
0.5 INJECTION, SOLUTION EPIDURAL; INFILTRATION; INTRACAUDAL; PERINEURAL ONCE AS NEEDED
Status: DISCONTINUED | OUTPATIENT
Start: 2023-08-15 | End: 2023-08-15 | Stop reason: HOSPADM

## 2023-08-15 RX ORDER — LIDOCAINE HYDROCHLORIDE 20 MG/ML
INJECTION, SOLUTION EPIDURAL; INFILTRATION; INTRACAUDAL; PERINEURAL AS NEEDED
Status: DISCONTINUED | OUTPATIENT
Start: 2023-08-15 | End: 2023-08-15 | Stop reason: SURG

## 2023-08-15 RX ORDER — SODIUM CHLORIDE 9 MG/ML
500 INJECTION, SOLUTION INTRAVENOUS CONTINUOUS PRN
Status: DISCONTINUED | OUTPATIENT
Start: 2023-08-15 | End: 2023-08-15 | Stop reason: HOSPADM

## 2023-08-15 RX ADMIN — SODIUM CHLORIDE 500 ML: 9 INJECTION, SOLUTION INTRAVENOUS at 09:19

## 2023-08-15 RX ADMIN — PROPOFOL INJECTABLE EMULSION 250 MG: 10 INJECTION, EMULSION INTRAVENOUS at 10:28

## 2023-08-15 RX ADMIN — LIDOCAINE HYDROCHLORIDE 80 MG: 20 INJECTION, SOLUTION EPIDURAL; INFILTRATION; INTRACAUDAL; PERINEURAL at 10:28

## 2023-08-15 NOTE — H&P
[facility] Gastroenterology  Pre Procedure History & Physical    Chief Complaint:   History of polyps    Subjective     HPI:   Here for colonoscopy.  History of polyps    Past Medical History:   Past Medical History:   Diagnosis Date    History of stress test     Hx of colonic polyps     Hypercholesteremia        Past Surgical History:  Past Surgical History:   Procedure Laterality Date    COLONOSCOPY  05/01/2013    snare ascending, 70, 50, ticks recall 1 year    COLONOSCOPY N/A 05/01/2018    Hyperplastic polyp Hepatic Flexure, tics repeat exam in 5 years    COLONOSCOPY W/ POLYPECTOMY  06/12/2014    Hyperplastic polyp at 70 cm repeat exam in 3 years    HERNIA REPAIR         Family History:  Family History   Problem Relation Age of Onset    No Known Problems Mother     Heart disease Father     Colon polyps Father     Colon cancer Neg Hx        Social History:   reports that he has never smoked. He has never used smokeless tobacco. He reports current alcohol use. He reports that he does not use drugs.    Medications:   Prior to Admission medications    Medication Sig Start Date End Date Taking? Authorizing Provider   glucosamine-chondroitin 500-400 MG capsule capsule Take 1 capsule by mouth.   Yes ProviderPenelope MD   multivitamin with minerals tablet tablet Take 1 tablet by mouth Daily. Health and vitality [facility]   Yes Penelope Carrillo MD   rosuvastatin (CRESTOR) 20 MG tablet Take 0.5 tablets by mouth Daily.   Yes Penelope Carrillo MD   coenzyme Q10 50 MG capsule capsule Take 1 capsule by mouth Daily.    ProviderPenelope MD   sildenafil (VIAGRA) 50 MG tablet Take 1 tablet by mouth Daily As Needed for Erectile Dysfunction.    ProviderPenelope MD   Sod Picosulfate-Mag Ox-Cit Acd (Clenpiq) 10-3.5-12 MG-GM -GM/160ML solution Take 175 mL by mouth Take As Directed. 8/7/23 8/15/23  Ashanti Dolan APRN       Allergies:  Patient has no known allergies.    Objective     Blood pressure 147/91,  "pulse 54, temperature 96.3 øF (35.7 øC), temperature source Temporal, resp. rate 20, height 185.4 cm (73\"), weight 99.3 kg (219 lb), SpO2 97 %.    Physical Exam   Constitutional: Pt is oriented to person, place, and in no distress.   HENT: Mouth/Throat: Oropharynx is clear.   Cardiovascular: Normal rate, regular rhythm.    Pulmonary/Chest: Effort normal. No respiratory distress. No  wheezes.   Abdominal: Soft. Non-distended.  Skin: Skin is warm and dry.   Psychiatric: Mood, memory, affect and judgment appear normal.     Assessment & Plan     Diagnosis:  History of polyps    Anticipated Surgical Procedure:    Proceed with colonoscopy as scheduled    The following major R/B/A were discussed with the patient, however the list is not all inclusive . Risk:  Bleeding (immediate and delayed), perforation (rupture or tear), reaction to medication, missed lesion/cancer, pain during the procedure, infection, need for surgery, need for ostomy, need for mechanical ventilation (breathing machine), death.  Benefits: removal of polyp/tissue, burn/clip/or inject to stop bleeding, removal of foreign body, dilate any stricture.  Alternatives: Xray or CT, surgery, do nothing with associated risk   The patient was given time to ask question and received explanation, and agrees to proceed as per History and Physical.   No guarantee given or expressed.    EMR Dragon/transcription disclaimer: Much of this encounter note is an electronic transcription/translation of spoken language to printed text.  The electronic translation of spoken language may permit erroneous, or at times, nonsensical words or phrases to be inadvertently transcribed.  Although I have reviewed the note for such errors, some may still exist.    Vicente Richter MD  10:28 CDT  8/15/2023    "

## 2023-08-15 NOTE — ANESTHESIA PREPROCEDURE EVALUATION
Anesthesia Evaluation     Patient summary reviewed and Nursing notes reviewed   no history of anesthetic complications:   NPO Solid Status: > 8 hours  NPO Liquid Status: > 4 hours           Airway   Mallampati: I  TM distance: >3 FB  Neck ROM: full  No difficulty expected  Dental - normal exam     Pulmonary - negative pulmonary ROS   (-) asthma, sleep apnea, not a smoker  Cardiovascular   Exercise tolerance: good (4-7 METS)    (+) hyperlipidemia      Neuro/Psych- negative ROS  (-) seizures, TIA, CVA  GI/Hepatic/Renal/Endo - negative ROS   (-) liver disease, no renal disease, diabetes, no thyroid disorder    Musculoskeletal (-) negative ROS    Abdominal    Substance History - negative use     OB/GYN negative ob/gyn ROS         Other - negative ROS                       Anesthesia Plan    ASA 2     MAC     intravenous induction     Anesthetic plan, risks, benefits, and alternatives have been provided, discussed and informed consent has been obtained with: patient and spouse/significant other.

## 2023-08-15 NOTE — ANESTHESIA POSTPROCEDURE EVALUATION
"Patient: Osbaldo Cat    Procedure Summary       Date: 08/15/23 Room / Location: [facility] ENDOSCOPY 4 / [facility] ENDOSCOPY    Anesthesia Start: 1027 Anesthesia Stop: 1052    Procedure: COLONOSCOPY WITH ANESTHESIA Diagnosis:       Hx of colonic polyps      (Hx of colonic polyps [Z86.010])    Surgeons: Vicente Richter MD Provider: Toni Puente CRNA    Anesthesia Type: MAC ASA Status: 2            Anesthesia Type: MAC    Vitals  Vitals Value Taken Time   /80 08/15/23 1056   Temp     Pulse 56 08/15/23 1101   Resp 12 08/15/23 1055   SpO2 97 % 08/15/23 1101   Vitals shown include unvalidated device data.        Post Anesthesia Care and Evaluation    Patient location during evaluation: PACU  Patient participation: complete - patient participated  Level of consciousness: awake and alert  Pain management: adequate    Airway patency: patent  Anesthetic complications: No anesthetic complications    Cardiovascular status: acceptable  Respiratory status: acceptable  Hydration status: acceptable    Comments: Blood pressure 106/80, pulse 56, temperature 96.3 øF (35.7 øC), temperature source Temporal, resp. rate 12, height 185.4 cm (73\"), weight 99.3 kg (219 lb), SpO2 96 %.    Pt discharged from PACU based on peggy score >8    "

## 2023-08-16 ENCOUNTER — TELEPHONE (OUTPATIENT)
Dept: UROLOGY | Facility: CLINIC | Age: 73
End: 2023-08-16
Payer: MEDICARE

## 2023-08-16 LAB
CYTO UR: NORMAL
LAB AP CASE REPORT: NORMAL
Lab: NORMAL
PATH REPORT.FINAL DX SPEC: NORMAL
PATH REPORT.GROSS SPEC: NORMAL

## 2023-08-16 NOTE — TELEPHONE ENCOUNTER
Called Patient to remind them to get MRI prior to appointment.  Spoke with Patient.  If patient calls back it is ok for the HUB to tell the pt the message.

## 2023-08-21 ENCOUNTER — HOSPITAL ENCOUNTER (OUTPATIENT)
Dept: MRI IMAGING | Facility: HOSPITAL | Age: 73
Discharge: HOME | End: 2023-08-21
Admitting: UROLOGY
Payer: MEDICARE

## 2023-08-21 DIAGNOSIS — R97.20 ELEVATED PROSTATE SPECIFIC ANTIGEN (PSA): ICD-10-CM

## 2023-08-21 LAB — CREAT BLDA-MCNC: 0.7 MG/DL (ref 0.6–1.3)

## 2023-08-21 PROCEDURE — 82565 ASSAY OF CREATININE: CPT

## 2023-08-21 PROCEDURE — 72197 MRI PELVIS W/O & W/DYE: CPT

## 2023-08-21 PROCEDURE — A9577 INJ MULTIHANCE: HCPCS | Performed by: UROLOGY

## 2023-08-21 PROCEDURE — 0 GADOBENATE DIMEGLUMINE 529 MG/ML SOLUTION: Performed by: UROLOGY

## 2023-08-21 RX ADMIN — GADOBENATE DIMEGLUMINE 19 ML: 529 INJECTION, SOLUTION INTRAVENOUS at 13:41

## 2023-08-24 ENCOUNTER — OFFICE VISIT (OUTPATIENT)
Dept: UROLOGY | Facility: CLINIC | Age: 73
End: 2023-08-24
Payer: MEDICARE

## 2023-08-24 VITALS — BODY MASS INDEX: 29.82 KG/M2 | HEIGHT: 73 IN | WEIGHT: 225 LBS | TEMPERATURE: 97 F

## 2023-08-24 DIAGNOSIS — R97.20 ELEVATED PROSTATE SPECIFIC ANTIGEN (PSA): Primary | ICD-10-CM

## 2023-08-24 PROCEDURE — 1159F MED LIST DOCD IN RCRD: CPT | Performed by: UROLOGY

## 2023-08-24 PROCEDURE — 1160F RVW MEDS BY RX/DR IN RCRD: CPT | Performed by: UROLOGY

## 2023-08-24 PROCEDURE — 99213 OFFICE O/P EST LOW 20 MIN: CPT | Performed by: UROLOGY

## 2024-02-12 NOTE — PROGRESS NOTES
Chief Complaint  Elevated PSA    Subjective          Osbaldo VAISHALI Cat presents to [facility] UROLOGY   Mr Cat returns today for a elevated PsA   No change in his lower tract symptoms.  Patient denies any possible systemic symptoms of prostate cancer such as weight loss, lower extremity edema, or skeletal pain that could be worrisome for systemic disease.     MRI Pelvis With & Without Contrast (08/21/2023 13:51) - no suspicious lesions    Tissue Pathology Exam (08/06/2021 07:53)  - negative TRUS/Bx prostate gland  Prostate volume: 36.8 cc   PSA density: 0.27      Current Outpatient Medications:     coenzyme Q10 50 MG capsule capsule, Take 1 capsule by mouth Daily., Disp: , Rfl:     multivitamin with minerals tablet tablet, Take 1 tablet by mouth Daily. Health and vitality [facility], Disp: , Rfl:     rosuvastatin (CRESTOR) 20 MG tablet, Take 0.5 tablets by mouth Daily., Disp: , Rfl:     glucosamine-chondroitin 500-400 MG capsule capsule, Take 1 capsule by mouth. (Patient not taking: Reported on 2/27/2024), Disp: , Rfl:     sildenafil (VIAGRA) 50 MG tablet, Take 1 tablet by mouth Daily As Needed for Erectile Dysfunction. (Patient not taking: Reported on 2/27/2024), Disp: , Rfl:   Past Medical History:   Diagnosis Date    History of stress test     Hx of colonic polyps     Hypercholesteremia      Past Surgical History:   Procedure Laterality Date    COLONOSCOPY  05/01/2013    snare ascending, 70, 50, ticks recall 1 year    COLONOSCOPY N/A 05/01/2018    Hyperplastic polyp Hepatic Flexure, tics repeat exam in 5 years    COLONOSCOPY N/A 8/15/2023    Procedure: COLONOSCOPY WITH ANESTHESIA;  Surgeon: Vicente Richter MD;  Location: [facility] ENDOSCOPY;  Service: Gastroenterology;  Laterality: N/A;  pre hx colon polyp  post diverticulosis, polyp  dr letha grimaldo    COLONOSCOPY W/ POLYPECTOMY  06/12/2014    Hyperplastic polyp at 70 cm repeat exam in 3 years    HERNIA REPAIR             Review of  "Systems      Objective   PHYSICAL EXAM  Vital Signs:   Ht 185.4 cm (73\")   Wt 102 kg (225 lb)   BMI 29.69 kg/m²     Physical Exam      DATA  Result Review :              Results for orders placed or performed in visit on 02/27/24   POC Urinalysis Dipstick, Multipro    Specimen: Urine   Result Value Ref Range    Color Yellow Yellow, Straw, Dark Yellow, Leana    Clarity, UA Clear Clear    Glucose, UA Negative Negative mg/dL    Bilirubin Negative Negative    Ketones, UA Negative Negative    Specific Gravity  1.025 1.005 - 1.030    Blood, UA Trace (A) Negative    pH, Urine 6.5 5.0 - 8.0    Protein, POC Negative Negative mg/dL    Urobilinogen, UA 0.2 E.U./dL Normal, 0.2 E.U./dL    Nitrite, UA Negative Negative    Leukocytes Negative Negative         Date                            PSA  06/2023                       11.82  06/06/2022                  12.4  05/12/2021                  9.8    ASSESSMENT AND PLAN          Problem List Items Addressed This Visit    None  Visit Diagnoses       Elevated prostate specific antigen (PSA)    -  Primary    Relevant Orders    POC Urinalysis Dipstick, Multipro (Completed)        PSA is still considerably elevated but he has a prior history of a negative biopsy.  His PSA was 9.8 at the time we did his prior biopsy which was negative. Given prior hx negative biopsy and a normal MRI, I feel it is reasonable to follow this semi-annually. May repeat MRI if PSA continues to increase.         FOLLOW UP     Return in about 6 months (around 8/27/2024) for PSA before visit.        (Please note that portions of this note were completed with a voice recognition program.)  Eliot Kay MD  02/27/24  14:14 CST  "

## 2024-02-19 ENCOUNTER — TELEPHONE (OUTPATIENT)
Dept: UROLOGY | Facility: CLINIC | Age: 74
End: 2024-02-19
Payer: MEDICARE

## 2024-02-20 ENCOUNTER — TELEPHONE (OUTPATIENT)
Dept: UROLOGY | Facility: CLINIC | Age: 74
End: 2024-02-20
Payer: MEDICARE

## 2024-02-20 NOTE — TELEPHONE ENCOUNTER
Patient called stating he is getting his labs drawn at [facility]. Faxed to [phone]

## 2024-02-22 ENCOUNTER — TELEPHONE (OUTPATIENT)
Dept: UROLOGY | Facility: CLINIC | Age: 74
End: 2024-02-22
Payer: MEDICARE

## 2024-02-26 ENCOUNTER — TELEPHONE (OUTPATIENT)
Dept: UROLOGY | Facility: CLINIC | Age: 74
End: 2024-02-26
Payer: MEDICARE

## 2024-02-26 NOTE — TELEPHONE ENCOUNTER
Called Patient to remind them to get PSA prior to appointment.  Unable to speak with Patient or leave voicemail.  If patient calls back it is ok for the HUB to tell the pt the message.

## 2024-02-27 ENCOUNTER — OFFICE VISIT (OUTPATIENT)
Dept: UROLOGY | Facility: CLINIC | Age: 74
End: 2024-02-27
Payer: MEDICARE

## 2024-02-27 VITALS — WEIGHT: 225 LBS | HEIGHT: 73 IN | BODY MASS INDEX: 29.82 KG/M2

## 2024-02-27 DIAGNOSIS — R97.20 ELEVATED PROSTATE SPECIFIC ANTIGEN (PSA): Primary | ICD-10-CM

## 2024-02-27 DIAGNOSIS — R97.20 ELEVATED PROSTATE SPECIFIC ANTIGEN (PSA): ICD-10-CM

## 2024-02-27 LAB
BILIRUB BLD-MCNC: NEGATIVE MG/DL
CLARITY, POC: CLEAR
COLOR UR: YELLOW
GLUCOSE UR STRIP-MCNC: NEGATIVE MG/DL
KETONES UR QL: NEGATIVE
LEUKOCYTE EST, POC: NEGATIVE
NITRITE UR-MCNC: NEGATIVE MG/ML
PH UR: 6.5 [PH] (ref 5–8)
PROT UR STRIP-MCNC: NEGATIVE MG/DL
RBC # UR STRIP: ABNORMAL /UL
SP GR UR: 1.02 (ref 1–1.03)
UROBILINOGEN UR QL: ABNORMAL

## 2024-02-27 PROCEDURE — 81003 URINALYSIS AUTO W/O SCOPE: CPT | Performed by: UROLOGY

## 2024-02-27 PROCEDURE — 99213 OFFICE O/P EST LOW 20 MIN: CPT | Performed by: UROLOGY

## 2024-02-27 PROCEDURE — 1160F RVW MEDS BY RX/DR IN RCRD: CPT | Performed by: UROLOGY

## 2024-02-27 PROCEDURE — 1159F MED LIST DOCD IN RCRD: CPT | Performed by: UROLOGY

## 2024-08-16 NOTE — PROGRESS NOTES
"Chief Complaint  Elevated PSA    Subjective          Osbaldo Cat presents to [facility] to show most follow-up for elevated PSA.  He has a prior history of a negative biopsy as well as a more recent negative MRI.  Patient denies any possible systemic symptoms of prostate cancer such as weight loss, lower extremity edema, or skeletal pain that could be worrisome for systemic disease.      MRI Pelvis With & Without Contrast (08/21/2023 13:51) - no suspicious lesions     Tissue Pathology Exam (08/06/2021 07:53)  - negative TRUS/Bx prostate gland  Prostate volume: 36.8 cc   PSA density: 0.27            Current Outpatient Medications:     coenzyme Q10 50 MG capsule capsule, Take 1 capsule by mouth Daily., Disp: , Rfl:     multivitamin with minerals tablet tablet, Take 1 tablet by mouth Daily. Health and vitality [facility], Disp: , Rfl:     rosuvastatin (CRESTOR) 20 MG tablet, Take 0.5 tablets by mouth Daily., Disp: , Rfl:   Past Medical History:   Diagnosis Date    History of stress test     Hx of colonic polyps     Hypercholesteremia      Past Surgical History:   Procedure Laterality Date    COLONOSCOPY  05/01/2013    snare ascending, 70, 50, ticks recall 1 year    COLONOSCOPY N/A 05/01/2018    Hyperplastic polyp Hepatic Flexure, tics repeat exam in 5 years    COLONOSCOPY N/A 8/15/2023    Procedure: COLONOSCOPY WITH ANESTHESIA;  Surgeon: Vicente Richter MD;  Location: [facility] ENDOSCOPY;  Service: Gastroenterology;  Laterality: N/A;  pre hx colon polyp  post diverticulosis, polyp  dr letha grimaldo    COLONOSCOPY W/ POLYPECTOMY  06/12/2014    Hyperplastic polyp at 70 cm repeat exam in 3 years    HERNIA REPAIR             Review of Systems      Objective   PHYSICAL EXAM  Vital Signs:   Temp 97.6 °F (36.4 °C)   Ht 185.4 cm (73\")   Wt 101 kg (223 lb 9.6 oz)   BMI 29.50 kg/m²     Physical Exam      DATA  Result Review :              Results for orders placed or performed in visit on 08/27/24   POC " Urinalysis Dipstick, Multipro    Specimen: Urine   Result Value Ref Range    Color Yellow Yellow, Straw, Dark Yellow, Leana    Clarity, UA Clear Clear    Glucose, UA Negative Negative mg/dL    Bilirubin Negative Negative    Ketones, UA Negative Negative    Specific Gravity  1.015 1.005 - 1.030    Blood, UA Negative Negative    pH, Urine 5.5 5.0 - 8.0    Protein, POC Negative Negative mg/dL    Urobilinogen, UA Normal Normal, 0.2 E.U./dL    Nitrite, UA Negative Negative    Leukocytes Negative Negative                Date                            PSA  06/2023                       11.82  06/06/2022                  12.4  05/12/2021                  9.8         ASSESSMENT AND PLAN          Problem List Items Addressed This Visit    None  Visit Diagnoses       Elevated prostate specific antigen (PSA)    -  Primary    Relevant Orders    POC Urinalysis Dipstick, Multipro (Completed)    PSA Diagnostic    MRI Pelvis With & Without Contrast          History of a prior biopsy which showed no evidence of prostate cancer when his PSA was 9.8.  He does not have a an enlarged prostate so his PSA density is quite elevated.  He also had a negative MRI in 2023.  We discussed his elevated PSA.  PSA of 13 is concerning but given his prior history of the negative biopsy and more recent negative MRI I think the best option is to repeat the MRI.  Is been a year since we have done that study.  If there are no lesions I think we can be more patient with this PSA.          FOLLOW UP     Return for muliparametric MRI pelvis (Prostate MRI) - appt with me 1-2 weeks after the MRI is completed.        (Please note that portions of this note were completed with a voice recognition program.)  Eliot Kay MD  08/27/24  15:10 CDT

## 2024-08-21 ENCOUNTER — TELEPHONE (OUTPATIENT)
Dept: UROLOGY | Facility: CLINIC | Age: 74
End: 2024-08-21
Payer: MEDICARE

## 2024-08-21 NOTE — TELEPHONE ENCOUNTER
Called Patient to remind them to get PSA prior to appointment.  Spoke with Patient.  If patient calls back it is ok for the HUB to tell the pt the message.  Pt request order be faxed to [facility]. Faxed to [phone]

## 2024-08-27 ENCOUNTER — OFFICE VISIT (OUTPATIENT)
Dept: UROLOGY | Facility: CLINIC | Age: 74
End: 2024-08-27
Payer: MEDICARE

## 2024-08-27 VITALS — HEIGHT: 73 IN | BODY MASS INDEX: 29.63 KG/M2 | WEIGHT: 223.6 LBS | TEMPERATURE: 97.6 F

## 2024-08-27 DIAGNOSIS — R97.20 ELEVATED PROSTATE SPECIFIC ANTIGEN (PSA): Primary | ICD-10-CM

## 2024-08-27 LAB
BILIRUB BLD-MCNC: NEGATIVE MG/DL
CLARITY, POC: CLEAR
COLOR UR: YELLOW
GLUCOSE UR STRIP-MCNC: NEGATIVE MG/DL
KETONES UR QL: NEGATIVE
LEUKOCYTE EST, POC: NEGATIVE
NITRITE UR-MCNC: NEGATIVE MG/ML
PH UR: 5.5 [PH] (ref 5–8)
PROT UR STRIP-MCNC: NEGATIVE MG/DL
RBC # UR STRIP: NEGATIVE /UL
SP GR UR: 1.01 (ref 1–1.03)
UROBILINOGEN UR QL: NORMAL

## 2024-09-26 ENCOUNTER — TELEPHONE (OUTPATIENT)
Dept: UROLOGY | Facility: CLINIC | Age: 74
End: 2024-09-26
Payer: MEDICARE

## 2024-10-01 ENCOUNTER — PATIENT MESSAGE (OUTPATIENT)
Dept: UROLOGY | Facility: CLINIC | Age: 74
End: 2024-10-01
Payer: MEDICARE

## 2024-10-01 NOTE — TELEPHONE ENCOUNTER
From: Laura TIWARI  To: Osbaldo Cat  Sent: 10/1/2024 9:08 AM CDT  Subject: Appointment Rescheduled with Dr. Eliza Kay had sent a message to let us know that he wanted us to cancel your appointment with him for 10/7/24 that was supposed to be your follow-up to your MRI, and reschedule you for late February. I have your new appointment scheduled for 2/25/2025 at 2:20pm. He would like for you to have a PSA done beforehand. If you have any questions, or this time/date doesn't work for you, please let us know. I will also send the new appointment by mail. Thank you!

## 2025-02-13 ENCOUNTER — TELEPHONE (OUTPATIENT)
Dept: UROLOGY | Facility: CLINIC | Age: 75
End: 2025-02-13
Payer: MEDICARE

## 2025-02-13 NOTE — PROGRESS NOTES
"Chief Complaint  Elevated PSA    Subjective          Osbaldo Cat presents to [facility]   Patient has elevated PSA.  He has had a prior MRI which showed no suspicious lesions.  He had a negative biopsy done 4-1/2 years ago.  Patient denies any possible systemic symptoms of prostate cancer such as weight loss, lower extremity edema, or skeletal pain that could be worrisome for systemic disease.             MRI Pelvis With & Without Contrast (08/21/2023 13:51) - no suspicious lesions     Tissue Pathology Exam (08/06/2021 07:53)  - negative TRUS/Bx prostate gland  Prostate volume: 36.8 cc   PSA density: 0.27                   No current outpatient medications on file.  Past Medical History:   Diagnosis Date    History of stress test     Hx of colonic polyps     Hypercholesteremia      Past Surgical History:   Procedure Laterality Date    COLONOSCOPY  05/01/2013    snare ascending, 70, 50, ticks recall 1 year    COLONOSCOPY N/A 05/01/2018    Hyperplastic polyp Hepatic Flexure, tics repeat exam in 5 years    COLONOSCOPY N/A 8/15/2023    Procedure: COLONOSCOPY WITH ANESTHESIA;  Surgeon: Vicente Richter MD;  Location: [facility] ENDOSCOPY;  Service: Gastroenterology;  Laterality: N/A;  pre hx colon polyp  post diverticulosis, polyp  dr letha grimaldo    COLONOSCOPY W/ POLYPECTOMY  06/12/2014    Hyperplastic polyp at 70 cm repeat exam in 3 years    HERNIA REPAIR             Review of Systems      Objective   PHYSICAL EXAM  Vital Signs:   Temp 97.3 °F (36.3 °C)   Ht 186.7 cm (73.5\")   Wt 101 kg (222 lb)   BMI 28.89 kg/m²     Physical Exam      DATA  Result Review :              Results for orders placed or performed in visit on 02/25/25   POC Urinalysis Dipstick, Multipro    Collection Time: 02/25/25  2:14 PM    Specimen: Urine   Result Value Ref Range    Color Yellow Yellow, Straw, Dark Yellow, Leana    Clarity, UA Clear Clear    Glucose, UA Negative Negative mg/dL    Bilirubin Negative " Negative    Ketones, UA Negative Negative    Specific Gravity  1.025 1.005 - 1.030    Blood, UA Moderate (A) Negative    pH, Urine 5.5 5.0 - 8.0    Protein, POC Negative Negative mg/dL    Urobilinogen, UA 0.2 E.U./dL Normal, 0.2 E.U./dL    Nitrite, UA Negative Negative    Leukocytes Negative Negative                          Date                            PSA  06/2023                       11.82  06/06/2022                  12.4  05/12/2021                  9.8       ASSESSMENT AND PLAN          Problem List Items Addressed This Visit    None  Visit Diagnoses       Elevated prostate specific antigen (PSA)    -  Primary    Relevant Orders    POC Urinalysis Dipstick, Multipro (Completed)    MRI Pelvis With & Without Contrast          His PSA is concerning.  I do think further evaluation is needed with imaging.  I would recommend that he have an MRI to assess whether or not there is been changes of significance that would warrant a repeat biopsy at this point.  Another option would be to consider genetic biomarker test but I think the anatomic study would be more appropriate in this setting.  He would like me to proceed with an MRI.        FOLLOW UP     Return for muliparametric MRI pelvis (Prostate MRI), f/u about 1 week after Prostate MRI.        (Please note that portions of this note were completed with a voice recognition program.)  Eliot Kay MD  02/26/25  12:58 CST

## 2025-02-13 NOTE — TELEPHONE ENCOUNTER
Caller: ELISA SOLIZ     Relationship to patient: SELF    Best call back number: [phone] (home)       Patient is needing: REQUEST FOR PSA ORDER TO [facility] IN [place], [place] BEFORE APPT 2.25.25

## 2025-02-21 ENCOUNTER — TELEPHONE (OUTPATIENT)
Dept: UROLOGY | Facility: CLINIC | Age: 75
End: 2025-02-21
Payer: MEDICARE

## 2025-02-25 ENCOUNTER — OFFICE VISIT (OUTPATIENT)
Dept: UROLOGY | Facility: CLINIC | Age: 75
End: 2025-02-25
Payer: MEDICARE

## 2025-02-25 VITALS — TEMPERATURE: 97.3 F | BODY MASS INDEX: 28.49 KG/M2 | HEIGHT: 74 IN | WEIGHT: 222 LBS

## 2025-02-25 DIAGNOSIS — R97.20 ELEVATED PROSTATE SPECIFIC ANTIGEN (PSA): Primary | ICD-10-CM

## 2025-02-25 LAB
BILIRUB BLD-MCNC: NEGATIVE MG/DL
CLARITY, POC: CLEAR
COLOR UR: YELLOW
GLUCOSE UR STRIP-MCNC: NEGATIVE MG/DL
KETONES UR QL: NEGATIVE
LEUKOCYTE EST, POC: NEGATIVE
NITRITE UR-MCNC: NEGATIVE MG/ML
PH UR: 5.5 [PH] (ref 5–8)
PROT UR STRIP-MCNC: NEGATIVE MG/DL
RBC # UR STRIP: ABNORMAL /UL
SP GR UR: 1.02 (ref 1–1.03)
UROBILINOGEN UR QL: ABNORMAL

## 2025-03-27 ENCOUNTER — TELEPHONE (OUTPATIENT)
Dept: UROLOGY | Facility: CLINIC | Age: 75
End: 2025-03-27
Payer: MEDICARE

## 2025-03-27 NOTE — TELEPHONE ENCOUNTER
Lou with [facility] called and stated that the patient's MRI was denied and that [facility] does not take his insurance and she would like to know if we would like for him to be sent somewhere else. Please advise

## 2025-03-27 NOTE — TELEPHONE ENCOUNTER
Spoke with Fely from [facility] stating patients insurance is out of network. That patient would need to go somewhere else that takes his insurance for the MRI. I advised her not to cancel the appointment yet as I will need to consult with Dr. Kay and his MA on this issue. Fely's contact number is [phone].

## 2025-03-31 ENCOUNTER — TELEPHONE (OUTPATIENT)
Dept: UROLOGY | Facility: CLINIC | Age: 75
End: 2025-03-31
Payer: MEDICARE

## 2025-03-31 NOTE — TELEPHONE ENCOUNTER
Provider: DR CLARK    Caller: Osbaldo Cat    Relationship to Patient: Self      Reason for Call: PT CALLED TO ADVISE HE RECVD A LETTER FROM [employer] THAT MRI WILL NOT BE COVERED BY INSUR.  MRI WAS SCHEDULED 4/1/25.    PLEASE REVIEW AND CALL PT CONFIRM IF APPROVAL CAN BE REQUESTED FOR MRI.    .

## (undated) DEVICE — THE SINGLE USE ETRAP – POLYP TRAP IS USED FOR SUCTION RETRIEVAL OF ENDOSCOPICALLY REMOVED POLYPS.: Brand: ETRAP

## (undated) DEVICE — CUFF,BP,DISP,1 TUBE,ADULT,HP: Brand: MEDLINE

## (undated) DEVICE — THE CHANNEL CLEANING BRUSH IS A NYLON FLEXI BRUSH ATTACHED TO A FLEXIBLE PLASTIC SHEATH DESIGNED TO SAFELY REMOVE DEBRIS FROM FLEXIBLE ENDOSCOPES.

## (undated) DEVICE — MSK O2 MD CONCENTR A/ LF 7FT 1P/U

## (undated) DEVICE — Device: Brand: DEFENDO AIR/WATER/SUCTION AND BIOPSY VALVE

## (undated) DEVICE — ENDOGATOR AUXILIARY WATER JET CONNECTOR: Brand: ENDOGATOR

## (undated) DEVICE — TBG SMPL FLTR LINE NASL 02/C02 A/ BX/100

## (undated) DEVICE — MASK,OXYGEN,MED CONC,ADLT,7' TUB, UC: Brand: PENDING

## (undated) DEVICE — SENSR O2 OXIMAX FNGR A/ 18IN NONSTR

## (undated) DEVICE — YANKAUER,BULB TIP WITH VENT: Brand: ARGYLE

## (undated) DEVICE — SNAR POLYP SENSATION MICRO OVL 13 240X40

## (undated) DEVICE — SINGLE-USE POLYPECTOMY SNARE: Brand: CAPTIVATOR II